# Patient Record
Sex: MALE | Race: WHITE | NOT HISPANIC OR LATINO | URBAN - METROPOLITAN AREA
[De-identification: names, ages, dates, MRNs, and addresses within clinical notes are randomized per-mention and may not be internally consistent; named-entity substitution may affect disease eponyms.]

---

## 2018-03-12 ENCOUNTER — RECORDS - HEALTHEAST (OUTPATIENT)
Dept: LAB | Facility: CLINIC | Age: 63
End: 2018-03-12

## 2018-03-12 LAB — ERYTHROCYTE [SEDIMENTATION RATE] IN BLOOD BY WESTERGREN METHOD: 81 MM/HR (ref 0–15)

## 2018-03-14 ENCOUNTER — HOSPITAL ENCOUNTER (OUTPATIENT)
Dept: CT IMAGING | Facility: CLINIC | Age: 63
Discharge: HOME | End: 2018-03-14
Attending: FAMILY MEDICINE

## 2018-03-14 DIAGNOSIS — R07.89 ATYPICAL CHEST PAIN: ICD-10-CM

## 2018-03-14 LAB
CREAT BLD-MCNC: 0.7 MG/DL
CREAT BLD-MCNC: 0.7 MG/DL (ref 0.7–1.3)
POC GFR AMER AF HE - HISTORICAL: >60 ML/MIN/1.73M2
POC GFR NON AMER AF HE - HISTORICAL: >60 ML/MIN/1.73M2

## 2018-04-16 ENCOUNTER — RECORDS - HEALTHEAST (OUTPATIENT)
Dept: LAB | Facility: CLINIC | Age: 63
End: 2018-04-16

## 2018-04-16 LAB
BASOPHILS # BLD AUTO: 0.2 THOU/UL (ref 0–0.2)
BASOPHILS NFR BLD AUTO: 1 % (ref 0–2)
EOSINOPHIL # BLD AUTO: 0.5 THOU/UL (ref 0–0.4)
EOSINOPHIL NFR BLD AUTO: 4 % (ref 0–6)
ERYTHROCYTE [DISTWIDTH] IN BLOOD BY AUTOMATED COUNT: 13.5 % (ref 11–14.5)
ERYTHROCYTE [SEDIMENTATION RATE] IN BLOOD BY WESTERGREN METHOD: 40 MM/HR (ref 0–15)
HCT VFR BLD AUTO: 41.7 % (ref 40–54)
HGB BLD-MCNC: 13.2 G/DL (ref 14–18)
LYMPHOCYTES # BLD AUTO: 2.1 THOU/UL (ref 0.8–4.4)
LYMPHOCYTES NFR BLD AUTO: 18 % (ref 20–40)
MCH RBC QN AUTO: 31.7 PG (ref 27–34)
MCHC RBC AUTO-ENTMCNC: 31.7 G/DL (ref 32–36)
MCV RBC AUTO: 100 FL (ref 80–100)
MONOCYTES # BLD AUTO: 1 THOU/UL (ref 0–0.9)
MONOCYTES NFR BLD AUTO: 9 % (ref 2–10)
NEUTROPHILS # BLD AUTO: 7.6 THOU/UL (ref 2–7.7)
NEUTROPHILS NFR BLD AUTO: 67 % (ref 50–70)
PLATELET # BLD AUTO: 352 THOU/UL (ref 140–440)
PMV BLD AUTO: 10.6 FL (ref 8.5–12.5)
RBC # BLD AUTO: 4.17 MILL/UL (ref 4.4–6.2)
WBC: 11.4 THOU/UL (ref 4–11)

## 2018-04-25 ENCOUNTER — AMBULATORY - HEALTHEAST (OUTPATIENT)
Dept: CARDIOLOGY | Facility: CLINIC | Age: 63
End: 2018-04-25

## 2018-04-25 ENCOUNTER — RECORDS - HEALTHEAST (OUTPATIENT)
Dept: ADMINISTRATIVE | Facility: OTHER | Age: 63
End: 2018-04-25

## 2018-04-26 ENCOUNTER — OFFICE VISIT - HEALTHEAST (OUTPATIENT)
Dept: CARDIOLOGY | Facility: CLINIC | Age: 63
End: 2018-04-26

## 2018-04-26 DIAGNOSIS — R07.9 CHEST PAIN: ICD-10-CM

## 2018-04-26 DIAGNOSIS — I51.89 LEFT VENTRICULAR SYSTOLIC DYSFUNCTION, NYHA CLASS 2: ICD-10-CM

## 2018-04-26 DIAGNOSIS — I30.9 ACUTE MYOPERICARDITIS: ICD-10-CM

## 2018-04-26 DIAGNOSIS — R60.9 PITTING EDEMA: ICD-10-CM

## 2018-04-26 DIAGNOSIS — I31.39 PERICARDIAL EFFUSION: ICD-10-CM

## 2018-04-26 LAB
ANION GAP SERPL CALCULATED.3IONS-SCNC: 13 MMOL/L (ref 5–18)
BUN SERPL-MCNC: 6 MG/DL (ref 8–22)
C REACTIVE PROTEIN LHE: 0.9 MG/DL (ref 0–0.8)
CALCIUM SERPL-MCNC: 10.2 MG/DL (ref 8.5–10.5)
CHLORIDE BLD-SCNC: 99 MMOL/L (ref 98–107)
CO2 SERPL-SCNC: 24 MMOL/L (ref 22–31)
CREAT SERPL-MCNC: 0.69 MG/DL (ref 0.7–1.3)
GFR SERPL CREATININE-BSD FRML MDRD: >60 ML/MIN/1.73M2
GLUCOSE BLD-MCNC: 95 MG/DL (ref 70–125)
POTASSIUM BLD-SCNC: 5 MMOL/L (ref 3.5–5)
SODIUM SERPL-SCNC: 136 MMOL/L (ref 136–145)

## 2018-04-26 ASSESSMENT — MIFFLIN-ST. JEOR: SCORE: 1855.4

## 2018-05-22 ENCOUNTER — HOSPITAL ENCOUNTER (OUTPATIENT)
Dept: MRI IMAGING | Facility: HOSPITAL | Age: 63
Discharge: HOME | End: 2018-05-22
Attending: INTERNAL MEDICINE

## 2018-05-29 ENCOUNTER — OFFICE VISIT - HEALTHEAST (OUTPATIENT)
Dept: CARDIOLOGY | Facility: CLINIC | Age: 63
End: 2018-05-29

## 2018-05-29 ENCOUNTER — COMMUNICATION - HEALTHEAST (OUTPATIENT)
Dept: CARDIOLOGY | Facility: HOSPITAL | Age: 63
End: 2018-05-29

## 2018-05-29 DIAGNOSIS — I30.0 ACUTE IDIOPATHIC PERICARDITIS: ICD-10-CM

## 2018-05-29 DIAGNOSIS — R07.89 ATYPICAL CHEST PAIN: ICD-10-CM

## 2018-05-29 ASSESSMENT — MIFFLIN-ST. JEOR: SCORE: 1814.58

## 2018-05-31 ENCOUNTER — HOSPITAL ENCOUNTER (OUTPATIENT)
Dept: MRI IMAGING | Facility: HOSPITAL | Age: 63
Discharge: HOME | End: 2018-05-31
Attending: INTERNAL MEDICINE

## 2018-05-31 DIAGNOSIS — I31.39 PERICARDIAL EFFUSION: ICD-10-CM

## 2018-05-31 DIAGNOSIS — R07.9 CHEST PAIN: ICD-10-CM

## 2018-05-31 DIAGNOSIS — I30.9 ACUTE MYOPERICARDITIS: ICD-10-CM

## 2018-05-31 DIAGNOSIS — R60.9 PITTING EDEMA: ICD-10-CM

## 2018-05-31 DIAGNOSIS — I51.89 LEFT VENTRICULAR SYSTOLIC DYSFUNCTION, NYHA CLASS 2: ICD-10-CM

## 2018-05-31 LAB
ATRIAL RATE - MUSE: 74 BPM
ATRIAL RATE - MUSE: 78 BPM
CREAT BLD-MCNC: 0.8 MG/DL
CREAT BLD-MCNC: 0.8 MG/DL (ref 0.7–1.3)
DIASTOLIC BLOOD PRESSURE - MUSE: NORMAL MMHG
DIASTOLIC BLOOD PRESSURE - MUSE: NORMAL MMHG
INTERPRETATION ECG - MUSE: NORMAL
INTERPRETATION ECG - MUSE: NORMAL
P AXIS - MUSE: 11 DEGREES
P AXIS - MUSE: 3 DEGREES
POC GFR AMER AF HE - HISTORICAL: >60 ML/MIN/1.73M2
POC GFR NON AMER AF HE - HISTORICAL: >60 ML/MIN/1.73M2
PR INTERVAL - MUSE: 162 MS
PR INTERVAL - MUSE: 176 MS
QRS DURATION - MUSE: 84 MS
QRS DURATION - MUSE: 88 MS
QT - MUSE: 392 MS
QT - MUSE: 400 MS
QTC - MUSE: 444 MS
QTC - MUSE: 446 MS
R AXIS - MUSE: 30 DEGREES
R AXIS - MUSE: 36 DEGREES
SYSTOLIC BLOOD PRESSURE - MUSE: NORMAL MMHG
SYSTOLIC BLOOD PRESSURE - MUSE: NORMAL MMHG
T AXIS - MUSE: 38 DEGREES
T AXIS - MUSE: 42 DEGREES
VENTRICULAR RATE- MUSE: 74 BPM
VENTRICULAR RATE- MUSE: 78 BPM

## 2018-06-01 LAB
CCTA EJECTION FRACTION: 70 %
CCTA INTERVENTRICULAR SETPUM: 0.9 CM (ref 0.6–1.1)
CCTA LEFT INTERNAL DIMENSION IN SYSTOLE: 3.3 CM (ref 2.1–4)
CCTA LEFT VENTRICULAR INTERNAL DIMENSION IN DIASTOLE: 5.6 CM (ref 3.5–6)
CCTA LEFT VENTRICULAR MASS: 152.27 G
CCTA POSTERIOR WALL: 0.6 CM (ref 0.6–1.1)
MR CARDIAC LEFT VENTRIAL CARDIAC INDEX: 3.1 L/MIN/M2 (ref 1.7–4.2)
MR CARDIAC LEFT VENTRICAL CARDIAC OUTPUT: 6.8 L/MIN (ref 2.8–8.8)
MR CARDIAC LEFT VENTRICULAR DIASTOLIC VOLUME INDEX: 81.28 ML/M2 (ref 47–92)
MR CARDIAC LEFT VENTRICULAR MASS INDEX: 59.28 G/M2 (ref 70–113)
MR CARDIAC LEFT VENTRICULAR MASS: 132 G (ref 118–238)
MR CARDIAC LEFT VENTRICULAR STROKE VOLUME INDEX: 44.46 ML/M2 (ref 32–62)
MR CARDIAC LEFT VENTRICULAR SYSTOLIC VOLUME INDEX: 36.82 ML/M2 (ref 13–30)
MR EJECTION FRACTION: 54.7 %
MR HEIGHT: 1.91 M
MR LEFT VENTRICULAR DYSTOLIC VOLUMEN: 181 ML (ref 77–195)
MR LEFT VENTRICULAR STROKE VOLUMEN: 99 ML (ref 51–133)
MR LEFT VENTRICULAR SYSTOLIC VOLUME: 82 ML (ref 19–72)
MR WEIGHT: 93.9 KG

## 2018-06-04 ENCOUNTER — COMMUNICATION - HEALTHEAST (OUTPATIENT)
Dept: CARDIOLOGY | Facility: CLINIC | Age: 63
End: 2018-06-04

## 2018-08-27 ENCOUNTER — RECORDS - HEALTHEAST (OUTPATIENT)
Dept: LAB | Facility: CLINIC | Age: 63
End: 2018-08-27

## 2018-08-27 LAB
ALBUMIN SERPL-MCNC: 4 G/DL (ref 3.5–5)
ALP SERPL-CCNC: 99 U/L (ref 45–120)
ALT SERPL W P-5'-P-CCNC: 18 U/L (ref 0–45)
ANION GAP SERPL CALCULATED.3IONS-SCNC: 11 MMOL/L (ref 5–18)
AST SERPL W P-5'-P-CCNC: 19 U/L (ref 0–40)
BASOPHILS # BLD AUTO: 0.2 THOU/UL (ref 0–0.2)
BASOPHILS NFR BLD AUTO: 2 % (ref 0–2)
BILIRUB SERPL-MCNC: 1.4 MG/DL (ref 0–1)
BUN SERPL-MCNC: 7 MG/DL (ref 8–22)
C REACTIVE PROTEIN LHE: 0.3 MG/DL (ref 0–0.8)
CALCIUM SERPL-MCNC: 10.2 MG/DL (ref 8.5–10.5)
CHLORIDE BLD-SCNC: 101 MMOL/L (ref 98–107)
CHOLEST SERPL-MCNC: 228 MG/DL
CO2 SERPL-SCNC: 24 MMOL/L (ref 22–31)
CREAT SERPL-MCNC: 0.71 MG/DL (ref 0.7–1.3)
EOSINOPHIL # BLD AUTO: 0.5 THOU/UL (ref 0–0.4)
EOSINOPHIL NFR BLD AUTO: 5 % (ref 0–6)
ERYTHROCYTE [DISTWIDTH] IN BLOOD BY AUTOMATED COUNT: 13.1 % (ref 11–14.5)
ERYTHROCYTE [SEDIMENTATION RATE] IN BLOOD BY WESTERGREN METHOD: 6 MM/HR (ref 0–15)
FASTING STATUS PATIENT QL REPORTED: ABNORMAL
GFR SERPL CREATININE-BSD FRML MDRD: >60 ML/MIN/1.73M2
GLUCOSE BLD-MCNC: 101 MG/DL (ref 70–125)
HCT VFR BLD AUTO: 44.1 % (ref 40–54)
HDLC SERPL-MCNC: 44 MG/DL
HGB BLD-MCNC: 14.6 G/DL (ref 14–18)
LDLC SERPL CALC-MCNC: 158 MG/DL
LIPASE SERPL-CCNC: 17 U/L (ref 0–52)
LYMPHOCYTES # BLD AUTO: 2.1 THOU/UL (ref 0.8–4.4)
LYMPHOCYTES NFR BLD AUTO: 20 % (ref 20–40)
MCH RBC QN AUTO: 32.7 PG (ref 27–34)
MCHC RBC AUTO-ENTMCNC: 33.1 G/DL (ref 32–36)
MCV RBC AUTO: 99 FL (ref 80–100)
MONOCYTES # BLD AUTO: 0.9 THOU/UL (ref 0–0.9)
MONOCYTES NFR BLD AUTO: 9 % (ref 2–10)
NEUTROPHILS # BLD AUTO: 6.5 THOU/UL (ref 2–7.7)
NEUTROPHILS NFR BLD AUTO: 65 % (ref 50–70)
PLATELET # BLD AUTO: 311 THOU/UL (ref 140–440)
PMV BLD AUTO: 10.8 FL (ref 8.5–12.5)
POTASSIUM BLD-SCNC: 4.5 MMOL/L (ref 3.5–5)
PROT SERPL-MCNC: 6.6 G/DL (ref 6–8)
RBC # BLD AUTO: 4.46 MILL/UL (ref 4.4–6.2)
SODIUM SERPL-SCNC: 136 MMOL/L (ref 136–145)
TRIGL SERPL-MCNC: 129 MG/DL
WBC: 10.1 THOU/UL (ref 4–11)

## 2018-11-27 ENCOUNTER — COMMUNICATION - HEALTHEAST (OUTPATIENT)
Dept: ADMINISTRATIVE | Facility: CLINIC | Age: 63
End: 2018-11-27

## 2019-02-05 ENCOUNTER — RECORDS - HEALTHEAST (OUTPATIENT)
Dept: ADMINISTRATIVE | Facility: OTHER | Age: 64
End: 2019-02-05

## 2019-02-05 ENCOUNTER — AMBULATORY - HEALTHEAST (OUTPATIENT)
Dept: CARDIOLOGY | Facility: CLINIC | Age: 64
End: 2019-02-05

## 2019-03-08 ENCOUNTER — OFFICE VISIT - HEALTHEAST (OUTPATIENT)
Dept: CARDIOLOGY | Facility: CLINIC | Age: 64
End: 2019-03-08

## 2019-03-08 DIAGNOSIS — I31.9 CHRONIC IDIOPATHIC PERICARDITIS, UNSPECIFIED COMPLICATION STATUS: ICD-10-CM

## 2019-03-08 DIAGNOSIS — K21.9 GASTROESOPHAGEAL REFLUX DISEASE WITHOUT ESOPHAGITIS: ICD-10-CM

## 2019-03-08 ASSESSMENT — MIFFLIN-ST. JEOR: SCORE: 1850.86

## 2019-03-12 ENCOUNTER — COMMUNICATION - HEALTHEAST (OUTPATIENT)
Dept: CARDIOLOGY | Facility: CLINIC | Age: 64
End: 2019-03-12

## 2019-03-12 DIAGNOSIS — I31.9 CHRONIC IDIOPATHIC PERICARDITIS: ICD-10-CM

## 2019-03-12 RX ORDER — IBUPROFEN 600 MG/1
600 TABLET ORAL 2 TIMES DAILY
Qty: 180 TABLET | Refills: 3 | Status: SHIPPED | OUTPATIENT
Start: 2019-03-12

## 2020-01-15 ENCOUNTER — RECORDS - HEALTHEAST (OUTPATIENT)
Dept: LAB | Facility: CLINIC | Age: 65
End: 2020-01-15

## 2020-01-15 LAB
ALBUMIN SERPL-MCNC: 4 G/DL (ref 3.5–5)
ALP SERPL-CCNC: 142 U/L (ref 45–120)
ALT SERPL W P-5'-P-CCNC: 24 U/L (ref 0–45)
ANION GAP SERPL CALCULATED.3IONS-SCNC: 10 MMOL/L (ref 5–18)
AST SERPL W P-5'-P-CCNC: 27 U/L (ref 0–40)
BILIRUB SERPL-MCNC: 1.2 MG/DL (ref 0–1)
BUN SERPL-MCNC: 6 MG/DL (ref 8–22)
CALCIUM SERPL-MCNC: 9.9 MG/DL (ref 8.5–10.5)
CHLORIDE BLD-SCNC: 98 MMOL/L (ref 98–107)
CHOLEST SERPL-MCNC: 247 MG/DL
CO2 SERPL-SCNC: 26 MMOL/L (ref 22–31)
CREAT SERPL-MCNC: 0.78 MG/DL (ref 0.7–1.3)
FASTING STATUS PATIENT QL REPORTED: YES
FOLATE SERPL-MCNC: 14.5 NG/ML
GFR SERPL CREATININE-BSD FRML MDRD: >60 ML/MIN/1.73M2
GLUCOSE BLD-MCNC: 108 MG/DL (ref 70–125)
HDLC SERPL-MCNC: 48 MG/DL
LDLC SERPL CALC-MCNC: 171 MG/DL
POTASSIUM BLD-SCNC: 4.4 MMOL/L (ref 3.5–5)
PROT SERPL-MCNC: 6.8 G/DL (ref 6–8)
SODIUM SERPL-SCNC: 134 MMOL/L (ref 136–145)
TRIGL SERPL-MCNC: 141 MG/DL
TSH SERPL DL<=0.005 MIU/L-ACNC: 1.68 UIU/ML (ref 0.3–5)
VIT B12 SERPL-MCNC: 713 PG/ML (ref 213–816)

## 2020-02-11 ENCOUNTER — RECORDS - HEALTHEAST (OUTPATIENT)
Dept: LAB | Facility: CLINIC | Age: 65
End: 2020-02-11

## 2020-02-11 LAB
ALBUMIN SERPL-MCNC: 3.8 G/DL (ref 3.5–5)
ALP SERPL-CCNC: 129 U/L (ref 45–120)
ALT SERPL W P-5'-P-CCNC: 26 U/L (ref 0–45)
ANION GAP SERPL CALCULATED.3IONS-SCNC: 10 MMOL/L (ref 5–18)
AST SERPL W P-5'-P-CCNC: 26 U/L (ref 0–40)
BILIRUB SERPL-MCNC: 0.8 MG/DL (ref 0–1)
BUN SERPL-MCNC: 7 MG/DL (ref 8–22)
CALCIUM SERPL-MCNC: 9.6 MG/DL (ref 8.5–10.5)
CHLORIDE BLD-SCNC: 99 MMOL/L (ref 98–107)
CO2 SERPL-SCNC: 26 MMOL/L (ref 22–31)
CREAT SERPL-MCNC: 0.75 MG/DL (ref 0.7–1.3)
GFR SERPL CREATININE-BSD FRML MDRD: >60 ML/MIN/1.73M2
GLUCOSE BLD-MCNC: 105 MG/DL (ref 70–125)
POTASSIUM BLD-SCNC: 4.1 MMOL/L (ref 3.5–5)
PROT SERPL-MCNC: 6.6 G/DL (ref 6–8)
SODIUM SERPL-SCNC: 135 MMOL/L (ref 136–145)

## 2020-02-13 ENCOUNTER — RECORDS - HEALTHEAST (OUTPATIENT)
Dept: LAB | Facility: CLINIC | Age: 65
End: 2020-02-13

## 2020-02-13 LAB — GGT SERPL-CCNC: 181 U/L (ref 0–50)

## 2020-02-17 LAB
ALP BONE SERPL-CCNC: 8 U/L (ref 0–55)
ALP LIVER SERPL-CCNC: 121 U/L (ref 0–94)
ALP OTHER SERPL-CCNC: 0 U/L
ALP SERPL-CCNC: 129 U/L (ref 40–120)

## 2020-04-10 ENCOUNTER — RECORDS - HEALTHEAST (OUTPATIENT)
Dept: LAB | Facility: CLINIC | Age: 65
End: 2020-04-10

## 2020-04-10 LAB
ALBUMIN SERPL-MCNC: 4 G/DL (ref 3.5–5)
ALP SERPL-CCNC: 138 U/L (ref 45–120)
ALT SERPL W P-5'-P-CCNC: 23 U/L (ref 0–45)
ANION GAP SERPL CALCULATED.3IONS-SCNC: 9 MMOL/L (ref 5–18)
AST SERPL W P-5'-P-CCNC: 24 U/L (ref 0–40)
BILIRUB SERPL-MCNC: 0.8 MG/DL (ref 0–1)
BUN SERPL-MCNC: 7 MG/DL (ref 8–22)
CALCIUM SERPL-MCNC: 9.7 MG/DL (ref 8.5–10.5)
CHLORIDE BLD-SCNC: 99 MMOL/L (ref 98–107)
CO2 SERPL-SCNC: 27 MMOL/L (ref 22–31)
CREAT SERPL-MCNC: 0.72 MG/DL (ref 0.7–1.3)
GFR SERPL CREATININE-BSD FRML MDRD: >60 ML/MIN/1.73M2
GLUCOSE BLD-MCNC: 90 MG/DL (ref 70–125)
POTASSIUM BLD-SCNC: 4.4 MMOL/L (ref 3.5–5)
PROT SERPL-MCNC: 7 G/DL (ref 6–8)
SODIUM SERPL-SCNC: 135 MMOL/L (ref 136–145)

## 2020-04-13 LAB
ALP BONE SERPL-CCNC: 13 U/L (ref 0–55)
ALP LIVER SERPL-CCNC: 128 U/L (ref 0–94)
ALP OTHER SERPL-CCNC: 0 U/L
ALP SERPL-CCNC: 141 U/L (ref 40–120)

## 2021-06-01 ENCOUNTER — RECORDS - HEALTHEAST (OUTPATIENT)
Dept: ADMINISTRATIVE | Facility: CLINIC | Age: 66
End: 2021-06-01

## 2021-06-01 VITALS — WEIGHT: 207 LBS | HEIGHT: 75 IN | BODY MASS INDEX: 25.74 KG/M2

## 2021-06-01 VITALS — WEIGHT: 216 LBS | HEIGHT: 75 IN | BODY MASS INDEX: 26.86 KG/M2

## 2021-06-02 VITALS — BODY MASS INDEX: 26.73 KG/M2 | WEIGHT: 215 LBS | HEIGHT: 75 IN

## 2021-06-16 PROBLEM — R07.89 ATYPICAL CHEST PAIN: Status: ACTIVE | Noted: 2018-05-29

## 2021-06-16 PROBLEM — I31.9 CHRONIC IDIOPATHIC PERICARDITIS: Status: ACTIVE | Noted: 2018-05-29

## 2021-06-17 NOTE — PATIENT INSTRUCTIONS - HE
Patient Instructions by Marcelino Metzger DO at 3/8/2019 11:10 AM     Author: Marcelino Metzger DO Service: -- Author Type: Physician    Filed: 3/8/2019 11:30 AM Encounter Date: 3/8/2019 Status: Signed    : Marcelino Metzger DO (Physician)       Below is a list of instructions we discussed today in clinic:   1. No cardiac medications needed at this time.   2. Take omeprazole 20 mg daily as needed for heartburn     It was a pleasure to meet with you today in clinic.  Please do not hesitate to call the [facility] clinic with any questions or concerns at [phone].    Sincerely,

## 2021-06-26 NOTE — PROGRESS NOTES
Progress Notes by Marcelino Metzger DO at 4/26/2018  9:10 AM     Author: Marcelino Metzger DO Service: -- Author Type: Physician    Filed: 4/26/2018 10:22 AM Encounter Date: 4/26/2018 Status: Signed    : Marcelino Metzger DO (Physician)           Click to link to [facility] HEART CARE NOTE    Thank you, Dr. Pedro, for asking the [facility] team to see Mr. Jarrett Fowler to evaluate pericarditis, left ventricular systolic dysfunction, chest pain.      Assessment/Recommendations   Assessment:    1.  Left ventricular systolic dysfunction in the setting of pericarditis concerning for myopericarditis  2.  Acute pericarditis, continues have intermittent episodes of chest pain  3.  Lower extremity edema    Plan:  1.  Start colchicine 0.6 mg daily.  He was given a good Rx coupon to him lower his cost  2.  Start metoprolol 25 mg BID  3.  Lisinopril 5 mg daily  4.  Check CRP and BMP  5.  Cardiac MRI with stress testing to assess for myopericarditis, constrictive pericarditis, cause of cardiomyopathy, stress test is requested given ongoing chest pain symptoms in a 63-year-old smoker with elevated risk for coronary disease.   6.  Continue indomethacin at this time will have him slowly taper over the course of 4 weeks.   7.  Will not stop colchicine until CRP level is normal will plan for 3 months.    8.  Further testing may be required based on MRI results       History of Present Illness    Mr. Jarrett Fowler is a 63 y.o. male with recent diagnosis of pericarditis who was found to have significant left ventricular systolic dysfunction on echocardiogram presents in cardiology clinic in consultation.    According to the patient he began experiencing severe chest pain symptoms in March 2018.  At that time he underwent CT of his chest which was reviewed which demonstrated pericardial thickening concerning for pericarditis.  He was started on indomethacin  treatment with improvement in his chest pain symptoms.  He describes chest pain as a sharp constant chest pain that was worse with leaning forward or bending over.  Since being started on indomethacin he has noted intermittent episodes of chest pain particularly if he misses any doses of anti-inflammatory medications.  Prescription was sent for colchicine but unfortunately his insurance denied this coverage initially.  I did give him a good Rx coupon today which will lower his cost $200 for 3 month therapy.      Currently has mild pitting lower extremity edema.  He does have some mild dyspnea with exertion.  Denies any syncopal episode or palpitations.    Echo report from [facility] was reviewed.  Recent outpatient primary care provider notes were reviewed.  ESR was 80 on initial presentation.         Physical Examination Review of Systems   Vitals:    04/26/18 0915   BP: 144/78   Pulse: 80   Resp: 16     Body mass index is 27 kg/(m^2).  Wt Readings from Last 3 Encounters:   04/26/18 216 lb (98 kg)       General Appearance:   no distress, normal body habitus   ENT/Mouth: membranes moist, no oral lesions or bleeding gums.      EYES:  no scleral icterus, normal conjunctivae   Neck: no carotid bruits or thyromegaly   Chest/Lungs:   lungs are clear to auscultation, no rales or wheezing, no sternal scar, equal chest wall expansion    Cardiovascular:   Regular. Normal first and second heart sounds with no murmurs, no rubs, or gallops; the carotid, radial and posterior tibial pulses are intact, Jugular venous pressure normal, mild pitting edema bilaterally    Abdomen:  no organomegaly, masses, bruits, or tenderness; bowel sounds are present   Extremities: no cyanosis or clubbing   Skin: no xanthelasma, warm.    Neurologic: normal gait, normal  bilateral, no tremors     Psychiatric: alert and oriented x3, calm     General: WNL  Eyes: WNL  Ears/Nose/Throat: WNL  Lungs: Cough  Heart: Chest Pain  Stomach: Heartburn  Bladder:  WNL  Muscle/Joints: WNL  Skin: WNL  Nervous System: WNL  Mental Health: WNL     Blood: WNL     Medical History  Surgical History Family History Social History   Past Medical History:   Diagnosis Date   ? Pericarditis     Past Surgical History:   Procedure Laterality Date   ? TONSILLECTOMY      age 18    Family History   Problem Relation Age of Onset   ? Transient ischemic attack Mother    ? Diabetes type I Daughter     Social History     Social History   ? Marital status: Single     Spouse name: N/A   ? Number of children: N/A   ? Years of education: N/A     Occupational History   ? Not on file.     Social History Main Topics   ? Smoking status: Current Every Day Smoker     Types: Cigarettes   ? Smokeless tobacco: Never Used   ? Alcohol use Not on file   ? Drug use: Not on file   ? Sexual activity: Not on file     Other Topics Concern   ? Not on file     Social History Narrative   ? No narrative on file          Medications  Allergies   Current Outpatient Prescriptions   Medication Sig Dispense Refill   ? indomethacin (INDOCIN) 25 MG capsule Take 25 mg by mouth 3 (three) times a day.  0     No current facility-administered medications for this visit.       No Known Allergies      Lab Results    Chemistry/lipid CBC Cardiac Enzymes/BNP/TSH/INR   No results found for: CHOL, HDL, LDLCALC, TRIG, CREATININE, BUN, K, NA, CL, CO2 Lab Results   Component Value Date    WBC 11.4 (H) 04/16/2018    HGB 13.2 (L) 04/16/2018    HCT 41.7 04/16/2018     04/16/2018     04/16/2018    No results found for: CKTOTAL, CKMB, CKMBINDEX, TROPONINI, BNP, TSH, INR

## 2021-06-26 NOTE — PROGRESS NOTES
Progress Notes by Marcelino Metzger DO at 5/29/2018  8:30 AM     Author: Marcelino Metzger DO Service: -- Author Type: Physician    Filed: 5/29/2018  8:54 AM Encounter Date: 5/29/2018 Status: Signed    : Marcelino Metzger DO (Physician)           Click to link to [facility]     [facility] HEART CARE NOTE    Thank you, Dr. Pedro, for asking the [facility] team to see Mr. Jarrett Fowler to evaluate pericarditis, left ventricular systolic dysfunction, chest pain.      Assessment/Recommendations   Assessment:    1.  Left ventricular systolic dysfunction in the setting of pericarditis concerning for myopericarditis. Cardiac MRI is pending for this week.    2.  Acute pericarditis likely idiopathic, chest pain has resolves with colchicine use.    3.  Lower extremity edema, resolved.      Plan:  1.  Continue colchicine 0.6 mg daily, until 3 month course completed.    2.  Continue metoprolol 25 mg BID  3.  Continue lisinopril 5 mg daily, stop if LVEF normalized on cardiac MRI.  If LVEF < 50% will change to losartan.    4.  Cardiac MRI pending for this week.           History of Present Illness    Mr. Jarrett Fowler is a 63 y.o. male with with recent acute pericarditis with associated left ventricular systolic dysfunction presents in cardiology clinic in follow-up.     Since initial consultation the patient has noticed resolution of his lower extremity edema.  His chest pain symptoms have completely resolved.  He denies any chest pain with exercise or activity at this time.  He is scheduled to undergo cardiac MRI stress on Thursday to assess left ventricular function given he had depressed left ventricular ejection fraction in the setting of acute pericarditis likely myopericarditis.    He has been able to start on colchicine and therapy.  He states a couple weeks after starting colchicine his chest pain symptoms completely resolved.  Lower extremity edema remains at  trace but markedly improved.  He is compliant with his metoprolol and lisinopril therapy.    Has noticed a cough with lisinopril.  Blood pressure and heart rates are well controlled today.  The patient has been active over the last month with no significant cardiac symptoms.           Physical Examination Review of Systems   Vitals:    05/29/18 0831   BP: 126/74   Pulse: 64   Resp: 16     Body mass index is 25.87 kg/(m^2).  Wt Readings from Last 3 Encounters:   05/29/18 207 lb (93.9 kg)   04/26/18 216 lb (98 kg)       General Appearance:   no distress, normal body habitus   ENT/Mouth: membranes moist, no oral lesions or bleeding gums.      EYES:  no scleral icterus, normal conjunctivae   Neck: no carotid bruits or thyromegaly   Chest/Lungs:   lungs are clear to auscultation, no rales or wheezing, no sternal scar, equal chest wall expansion    Cardiovascular:   Regular. Normal first and second heart sounds with no murmurs, no rubs, or gallops; the carotid, radial and posterior tibial pulses are intact, Jugular venous pressure normal, trace pitting edema bilaterally (improved).   Abdomen:  no tenderness; bowel sounds are present   Extremities: no cyanosis or clubbing   Skin: no xanthelasma, warm.    Neurologic: normal gait, normal  bilateral, no tremors     Psychiatric: alert and oriented x3, calm     General: WNL  Eyes: WNL  Ears/Nose/Throat: WNL  Lungs: WNL  Heart: WNL  Stomach: WNL  Bladder: WNL  Muscle/Joints: WNL  Skin: WNL  Nervous System: WNL  Mental Health: WNL     Blood: WNL     Medical History  Surgical History Family History Social History   Past Medical History:   Diagnosis Date   ? Pericarditis     Past Surgical History:   Procedure Laterality Date   ? TONSILLECTOMY      age 18    Family History   Problem Relation Age of Onset   ? Transient ischemic attack Mother    ? Diabetes type I Daughter     Social History     Social History   ? Marital status: Single     Spouse name: N/A   ? Number of children:  N/A   ? Years of education: N/A     Occupational History   ? Not on file.     Social History Main Topics   ? Smoking status: Current Every Day Smoker     Types: Cigarettes   ? Smokeless tobacco: Never Used   ? Alcohol use Not on file   ? Drug use: Not on file   ? Sexual activity: Not on file     Other Topics Concern   ? Not on file     Social History Narrative          Medications  Allergies   Current Outpatient Prescriptions   Medication Sig Dispense Refill   ? colchicine 0.6 mg cap Take 0.6 mg by mouth daily. 90 capsule 0   ? lisinopril (PRINIVIL,ZESTRIL) 5 MG tablet Take 1 tablet (5 mg total) by mouth daily. 90 tablet 3   ? metoprolol tartrate (LOPRESSOR) 25 MG tablet Take 1 tablet (25 mg total) by mouth 2 (two) times a day. 180 tablet 3   ? indomethacin (INDOCIN) 25 MG capsule Take 25 mg by mouth 3 (three) times a day.  0     No current facility-administered medications for this visit.       No Known Allergies      Lab Results    Chemistry/lipid CBC Cardiac Enzymes/BNP/TSH/INR   Lab Results   Component Value Date    CREATININE 0.69 (L) 04/26/2018    BUN 6 (L) 04/26/2018    K 5.0 04/26/2018     04/26/2018    CL 99 04/26/2018    CO2 24 04/26/2018    Lab Results   Component Value Date    WBC 11.4 (H) 04/16/2018    HGB 13.2 (L) 04/16/2018    HCT 41.7 04/16/2018     04/16/2018     04/16/2018    No results found for: CKTOTAL, CKMB, CKMBINDEX, TROPONINI, BNP, TSH, INR

## 2021-06-27 NOTE — PROGRESS NOTES
Progress Notes by Marcelino Metzger DO at 3/8/2019 11:10 AM     Author: Marcelino Metzger DO Service: -- Author Type: Physician    Filed: 3/8/2019 11:39 AM Encounter Date: 3/8/2019 Status: Signed    : Marcelino Metzger DO (Physician)           Click to link to [facility] HEART CARE NOTE    Thank you, Dr. Pedro, for asking the [facility] Care team to see Mr. Jarrett Fowler to evaluate pericarditis, left ventricular systolic dysfunction, chest pain.      Assessment/Recommendations   Assessment:    1.  Left ventricular systolic dysfunction in the setting of pericarditis concerning for myopericarditis. Cardiac MRI demonstrated resolution of LV systolic dysfunction.  No active pericarditis or myocarditis.  NYHA I.     2.  Acute pericarditis likely idiopathic  3.  Lower extremity edema, resolved.  4.  GERD, on OTC Omeprazole.       Plan:  1.  Patient is off all cardiac medications at this time.   2.  Follow-up as needed.  3.  Continue omeprazole for heartburn symptoms         History of Present Illness    Mr. Jarrett Fowler is a 64 y.o. male with with recent acute pericarditis with associated left ventricular systolic dysfunction presents in cardiology clinic.    Last evaluation he underwent cardiac MRI which demonstrated normalization of the left ventricular function.  He had no sign of residual myopericarditis.  He did have some mild thickening of his pericardium which is likely from residual.  He has no chest pain symptoms.  He denies any dyspnea on exertion.  He has no orthopnea PND or lower extremity edema at this time.  He notes no episodes of palpitations.  He is off all of his cardiac medications including lisinopril and metoprolol.  He does have issues with gastroesophageal reflux which she is followed with his primary care provider and takes Prilosec which has improved his symptoms.  Currently today has no concerns.    Did review in detail with  him his cardiac MRI results.           Physical Examination Review of Systems   Vitals:    03/08/19 1114   BP: 126/82   Pulse: (!) 104   Resp: 16     Body mass index is 26.87 kg/m .  Wt Readings from Last 3 Encounters:   03/08/19 215 lb (97.5 kg)   05/29/18 207 lb (93.9 kg)   04/26/18 216 lb (98 kg)       General Appearance:   no distress, normal body habitus   ENT/Mouth: membranes moist, no oral lesions or bleeding gums.      EYES:  no scleral icterus, normal conjunctivae   Neck: no carotid bruits or thyromegaly   Chest/Lungs:   lungs are clear to auscultation, no rales or wheezing, no sternal scar, equal chest wall expansion    Cardiovascular:   Regular. Normal first and second heart sounds with no murmurs, no rubs, or gallops; the carotid, radial and posterior tibial pulses are intact, Jugular venous pressure normal, trace pitting edema bilaterally (improved).   Abdomen:  no tenderness; bowel sounds are present   Extremities: no cyanosis or clubbing   Skin: no xanthelasma, warm.    Neurologic: normal gait, normal  bilateral, no tremors     Psychiatric: alert and oriented x3, calm     General: WNL  Eyes: WNL  Ears/Nose/Throat: WNL  Lungs: WNL  Heart: WNL  Stomach: Heartburn, Nausea  Bladder: WNL  Muscle/Joints: WNL  Skin: WNL  Nervous System: WNL  Mental Health: WNL     Blood: WNL     Medical History  Surgical History Family History Social History   Past Medical History:   Diagnosis Date   ? Pericarditis     Past Surgical History:   Procedure Laterality Date   ? TONSILLECTOMY      age 18    Family History   Problem Relation Age of Onset   ? Transient ischemic attack Mother    ? Diabetes type I Daughter     Social History     Socioeconomic History   ? Marital status: Single     Spouse name: Not on file   ? Number of children: Not on file   ? Years of education: Not on file   ? Highest education level: Not on file   Occupational History   ? Not on file   Social Needs   ? Financial resource strain: Not on file    ? Food insecurity:     Worry: Not on file     Inability: Not on file   ? Transportation needs:     Medical: Not on file     Non-medical: Not on file   Tobacco Use   ? Smoking status: Current Every Day Smoker     Types: Cigarettes   ? Smokeless tobacco: Never Used   Substance and Sexual Activity   ? Alcohol use: Not on file   ? Drug use: Not on file   ? Sexual activity: Not on file   Lifestyle   ? Physical activity:     Days per week: Not on file     Minutes per session: Not on file   ? Stress: Not on file   Relationships   ? Social connections:     Talks on phone: Not on file     Gets together: Not on file     Attends Sabianist service: Not on file     Active member of club or organization: Not on file     Attends meetings of clubs or organizations: Not on file     Relationship status: Not on file   ? Intimate partner violence:     Fear of current or ex partner: Not on file     Emotionally abused: Not on file     Physically abused: Not on file     Forced sexual activity: Not on file   Other Topics Concern   ? Not on file   Social History Narrative   ? Not on file          Medications  Allergies   Current Outpatient Medications   Medication Sig Dispense Refill   ?  mg tablet Take 1 tablet by mouth 2 (two) times a day.  0   ? colchicine 0.6 mg cap Take 0.6 mg by mouth daily. 90 capsule 0   ? indomethacin (INDOCIN) 25 MG capsule Take 25 mg by mouth 3 (three) times a day.  0   ? metoprolol tartrate (LOPRESSOR) 25 MG tablet Take 1 tablet (25 mg total) by mouth 2 (two) times a day. 180 tablet 3     No current facility-administered medications for this visit.       No Known Allergies      Lab Results    Chemistry/lipid CBC Cardiac Enzymes/BNP/TSH/INR   Lab Results   Component Value Date    CHOL 228 (H) 08/27/2018    HDL 44 08/27/2018    LDLCALC 158 (H) 08/27/2018    TRIG 129 08/27/2018    CREATININE 0.71 08/27/2018    BUN 7 (L) 08/27/2018    K 4.5 08/27/2018     08/27/2018     08/27/2018    CO2 24  08/27/2018    Lab Results   Component Value Date    WBC 10.1 08/27/2018    HGB 14.6 08/27/2018    HCT 44.1 08/27/2018    MCV 99 08/27/2018     08/27/2018    No results found for: CKTOTAL, CKMB, CKMBINDEX, TROPONINI, BNP, TSH, INR

## 2024-12-10 ENCOUNTER — LAB REQUISITION (OUTPATIENT)
Dept: LAB | Facility: CLINIC | Age: 69
End: 2024-12-10

## 2024-12-10 DIAGNOSIS — G62.9 POLYNEUROPATHY, UNSPECIFIED: ICD-10-CM

## 2024-12-10 LAB — ERYTHROCYTE [SEDIMENTATION RATE] IN BLOOD BY WESTERGREN METHOD: 9 MM/HR (ref 0–20)

## 2024-12-10 PROCEDURE — 84460 ALANINE AMINO (ALT) (SGPT): CPT | Performed by: FAMILY MEDICINE

## 2024-12-10 PROCEDURE — 84425 ASSAY OF VITAMIN B-1: CPT | Performed by: FAMILY MEDICINE

## 2024-12-10 PROCEDURE — 82607 VITAMIN B-12: CPT | Performed by: FAMILY MEDICINE

## 2024-12-10 PROCEDURE — 82310 ASSAY OF CALCIUM: CPT | Performed by: FAMILY MEDICINE

## 2024-12-10 PROCEDURE — 84443 ASSAY THYROID STIM HORMONE: CPT | Performed by: FAMILY MEDICINE

## 2024-12-10 PROCEDURE — 85652 RBC SED RATE AUTOMATED: CPT | Performed by: FAMILY MEDICINE

## 2024-12-10 PROCEDURE — 80053 COMPREHEN METABOLIC PANEL: CPT | Performed by: FAMILY MEDICINE

## 2024-12-11 LAB
ALBUMIN SERPL BCG-MCNC: 4.2 G/DL (ref 3.5–5.2)
ALP SERPL-CCNC: 98 U/L (ref 40–150)
ALT SERPL W P-5'-P-CCNC: 19 U/L (ref 0–70)
ANION GAP SERPL CALCULATED.3IONS-SCNC: 11 MMOL/L (ref 7–15)
AST SERPL W P-5'-P-CCNC: 19 U/L (ref 0–45)
BILIRUB SERPL-MCNC: 0.7 MG/DL
BUN SERPL-MCNC: 5.3 MG/DL (ref 8–23)
CALCIUM SERPL-MCNC: 9.8 MG/DL (ref 8.8–10.4)
CHLORIDE SERPL-SCNC: 96 MMOL/L (ref 98–107)
CREAT SERPL-MCNC: 0.72 MG/DL (ref 0.67–1.17)
EGFRCR SERPLBLD CKD-EPI 2021: >90 ML/MIN/1.73M2
GLUCOSE SERPL-MCNC: 107 MG/DL (ref 70–99)
HCO3 SERPL-SCNC: 26 MMOL/L (ref 22–29)
POTASSIUM SERPL-SCNC: 4.2 MMOL/L (ref 3.4–5.3)
PROT SERPL-MCNC: 6.7 G/DL (ref 6.4–8.3)
SODIUM SERPL-SCNC: 133 MMOL/L (ref 135–145)
TSH SERPL DL<=0.005 MIU/L-ACNC: 2.39 UIU/ML (ref 0.3–4.2)
VIT B12 SERPL-MCNC: 635 PG/ML (ref 232–1245)

## 2024-12-14 LAB — VIT B1 PYROPHOSHATE BLD-SCNC: 71 NMOL/L
